# Patient Record
Sex: FEMALE | Race: WHITE | NOT HISPANIC OR LATINO
[De-identification: names, ages, dates, MRNs, and addresses within clinical notes are randomized per-mention and may not be internally consistent; named-entity substitution may affect disease eponyms.]

---

## 2018-02-09 ENCOUNTER — TRANSCRIPTION ENCOUNTER (OUTPATIENT)
Age: 78
End: 2018-02-09

## 2021-06-25 ENCOUNTER — APPOINTMENT (OUTPATIENT)
Dept: SURGERY | Facility: CLINIC | Age: 81
End: 2021-06-25

## 2021-07-02 ENCOUNTER — APPOINTMENT (OUTPATIENT)
Dept: SURGERY | Facility: CLINIC | Age: 81
End: 2021-07-02

## 2021-07-09 ENCOUNTER — APPOINTMENT (OUTPATIENT)
Dept: SURGERY | Facility: CLINIC | Age: 81
End: 2021-07-09

## 2021-07-16 ENCOUNTER — APPOINTMENT (OUTPATIENT)
Dept: SURGERY | Facility: CLINIC | Age: 81
End: 2021-07-16

## 2021-07-23 ENCOUNTER — APPOINTMENT (OUTPATIENT)
Dept: SURGERY | Facility: CLINIC | Age: 81
End: 2021-07-23

## 2021-07-26 PROBLEM — Z00.00 ENCOUNTER FOR PREVENTIVE HEALTH EXAMINATION: Status: ACTIVE | Noted: 2021-07-26

## 2021-09-13 DIAGNOSIS — Z86.61 PERSONAL HISTORY OF INFECTIONS OF THE CENTRAL NERVOUS SYSTEM: ICD-10-CM

## 2021-09-13 DIAGNOSIS — Z87.39 PERSONAL HISTORY OF OTHER DISEASES OF THE MUSCULOSKELETAL SYSTEM AND CONNECTIVE TISSUE: ICD-10-CM

## 2021-09-13 DIAGNOSIS — Z87.898 PERSONAL HISTORY OF OTHER SPECIFIED CONDITIONS: ICD-10-CM

## 2021-09-13 DIAGNOSIS — Z86.69 PERSONAL HISTORY OF OTHER DISEASES OF THE NERVOUS SYSTEM AND SENSE ORGANS: ICD-10-CM

## 2021-09-14 ENCOUNTER — NON-APPOINTMENT (OUTPATIENT)
Age: 81
End: 2021-09-14

## 2021-09-14 ENCOUNTER — APPOINTMENT (OUTPATIENT)
Dept: BREAST CENTER | Facility: CLINIC | Age: 81
End: 2021-09-14
Payer: MEDICARE

## 2021-09-14 VITALS — DIASTOLIC BLOOD PRESSURE: 81 MMHG | SYSTOLIC BLOOD PRESSURE: 177 MMHG | HEART RATE: 88 BPM | OXYGEN SATURATION: 96 %

## 2021-09-14 VITALS — WEIGHT: 121 LBS | HEIGHT: 58 IN | BODY MASS INDEX: 25.4 KG/M2

## 2021-09-14 DIAGNOSIS — R92.2 INCONCLUSIVE MAMMOGRAM: ICD-10-CM

## 2021-09-14 DIAGNOSIS — Z87.891 PERSONAL HISTORY OF NICOTINE DEPENDENCE: ICD-10-CM

## 2021-09-14 DIAGNOSIS — Z92.89 PERSONAL HISTORY OF OTHER MEDICAL TREATMENT: ICD-10-CM

## 2021-09-14 DIAGNOSIS — I87.1 COMPRESSION OF VEIN: ICD-10-CM

## 2021-09-14 DIAGNOSIS — N63.0 UNSPECIFIED LUMP IN UNSPECIFIED BREAST: ICD-10-CM

## 2021-09-14 PROCEDURE — 99204 OFFICE O/P NEW MOD 45 MIN: CPT

## 2021-09-14 RX ORDER — PANTOPRAZOLE 40 MG/1
40 TABLET, DELAYED RELEASE ORAL
Refills: 0 | Status: ACTIVE | COMMUNITY

## 2021-09-14 RX ORDER — ALENDRONATE SODIUM 70 MG/1
70 TABLET ORAL
Refills: 0 | Status: ACTIVE | COMMUNITY

## 2021-09-14 RX ORDER — PREDNISONE 10 MG
10 TABLET ORAL
Refills: 0 | Status: ACTIVE | COMMUNITY

## 2021-09-14 RX ORDER — UMECLIDINIUM BROMIDE AND VILANTEROL TRIFENATATE 62.5; 25 UG/1; UG/1
POWDER RESPIRATORY (INHALATION)
Refills: 0 | Status: ACTIVE | COMMUNITY

## 2021-09-14 RX ORDER — ATORVASTATIN CALCIUM 40 MG/1
40 TABLET, FILM COATED ORAL
Refills: 0 | Status: ACTIVE | COMMUNITY

## 2021-09-14 RX ORDER — CITALOPRAM HYDROBROMIDE 10 MG/1
TABLET, FILM COATED ORAL
Refills: 0 | Status: ACTIVE | COMMUNITY

## 2021-09-14 RX ORDER — OXYCODONE 10 MG/1
10 TABLET ORAL
Refills: 0 | Status: ACTIVE | COMMUNITY

## 2021-09-14 RX ORDER — FUROSEMIDE 20 MG/1
20 TABLET ORAL
Refills: 0 | Status: ACTIVE | COMMUNITY

## 2021-09-14 RX ORDER — PREGABALIN 75 MG/1
75 CAPSULE ORAL
Refills: 0 | Status: ACTIVE | COMMUNITY

## 2021-09-14 RX ORDER — RIFAMPIN 300 MG/1
300 CAPSULE ORAL
Refills: 0 | Status: ACTIVE | COMMUNITY

## 2021-09-14 RX ORDER — FENTANYL 25 UG/H
25 PATCH, EXTENDED RELEASE TRANSDERMAL
Refills: 0 | Status: ACTIVE | COMMUNITY

## 2021-09-14 RX ORDER — OXYCODONE HYDROCHLORIDE AND ACETAMINOPHEN 5; 325 MG/1; MG/1
5-325 TABLET ORAL
Refills: 0 | Status: ACTIVE | COMMUNITY

## 2021-09-14 NOTE — HISTORY OF PRESENT ILLNESS
[FreeTextEntry1] : The patient is an 81-year-old white female with a long smoking history of 1-1/2 packs a day for many years.  She currently has COPD and has been on oxygen for the last year.  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  She developed some facial and left breast swelling and had shortness of breath and was admitted to [facility].  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  She was seen by the breast fellow at that time and felt to have lymphedema and is now sent in for post admission follow-up.

## 2021-09-14 NOTE — REASON FOR VISIT
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  She was here [facility] in August 2021 and was noticed had some increased size in the left breast consistent with lymphedema of the left breast and was sent for mammography and ultrasound on August 6, 2021 showing some edema in the left breast but with improvement but no suspicious findings.  She comes in now for a breast surgical evaluation.

## 2021-09-14 NOTE — PHYSICAL EXAM
[Normocephalic] : normocephalic [Atraumatic] : atraumatic [EOMI] : extra ocular movement intact [Supple] : supple [No Supraclavicular Adenopathy] : no supraclavicular adenopathy [No Cervical Adenopathy] : no cervical adenopathy [Examined in the supine and seated position] : examined in the supine and seated position [No dominant masses] : no dominant masses in right breast  [No dominant masses] : no dominant masses left breast [No Nipple Retraction] : no left nipple retraction [No Nipple Discharge] : no left nipple discharge [Breast Mass Right Breast ___cm] : no masses [Breast Mass Left Breast ___cm] : no masses [Breast Nipple Inversion] : nipples not inverted [Breast Nipple Retraction] : nipples not retracted [Breast Nipple Flattening] : nipples not flattened [Breast Nipple Fissures] : nipples not fissured [Breast Abnormal Lactation (Galactorrhea)] : no galactorrhea [Breast Abnormal Secretion Bloody Fluid] : no bloody discharge [Breast Abnormal Secretion Serous Fluid] : no serous discharge [Breast Abnormal Secretion Opalescent Fluid] : no milky discharge [No Axillary Lymphadenopathy] : no left axillary lymphadenopathy [No Edema] : no edema [No Rashes] : no rashes [No Ulceration] : no ulceration [de-identified] : On exam, the patient has ptotic B-cup breasts with the left breast larger than the right.  On palpation I cannot feel any suspicious densities in either breast.  She does have some significant swelling of the left breast but no palpable masses and this is all consistent with lymphedema of the breast and she does have pitting.  She has no axillary, supraclavicular, or cervical adenopathy.  There are some soft benign palpable lymph nodes felt in the left axilla. [de-identified] : Left breast swelling with some pitting edema consistent with lymphedema of the breast.

## 2021-09-14 NOTE — ASSESSMENT
[FreeTextEntry1] : The patient is an 81-year-old white female with a long smoking history of 1-1/2 packs a day for many years.  She currently has COPD and has been on oxygen for the last year.  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  She developed some facial and left breast swelling and had shortness of breath and was admitted to [facility].  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  She was seen by the breast fellow at that time and felt to have lymphedema.  She did undergo a CT angiogram while she was in the hospital which did show some upper extremity venous hypertension prior to removal of the Port-A-Cath.  She underwent a bilateral mammography and ultrasound on August 6, 2021 which actually showed some interval resolution of some of her right breast edema and skin thickening and some mild improvement of some of her left breast edema and skin thickening.  On exam, she has no suspicious masses in either breast but has an obvious larger left breast consistent with lymphedema since there is pitting.  She has no signs of any arm lymphedema however.  I do not believe this is a breast malignancy causing lymphedema.  I think this is related to likely some venous obstruction issues.  These may have improved somewhat with removal of the Port-A-Cath though the patient does not notice an improvement.  In either case unless this is her breast cancer, there is nothing that I can do to help with this lymphedema.  She was advised on symptomatic relief with a good compressive bra which will likely help some of the swelling.  She does have a PET scan scheduled so her lymph nodes can be evaluated to determine if this is a malignant etiology.  We will send me a copy the report.  Otherwise I would continue symptomatic relief and continue her routine follow-up with her oncologist, Dr. Garrett Madrigal, at [facility].  She can follow-up with me on a as needed basis.

## 2021-09-14 NOTE — REVIEW OF SYSTEMS
[Shortness Of Breath] : shortness of breath [Heartburn] : heartburn [Incontinence] : incontinence [Joint Stiffness] : joint stiffness [Anxiety] : anxiety [Depression] : depression [Easy Bruising] : a tendency for easy bruising [Negative] : Endocrine

## 2021-11-18 ENCOUNTER — APPOINTMENT (OUTPATIENT)
Dept: PULMONOLOGY | Facility: CLINIC | Age: 81
End: 2021-11-18
Payer: MEDICARE

## 2021-11-18 VITALS
DIASTOLIC BLOOD PRESSURE: 74 MMHG | WEIGHT: 127 LBS | HEIGHT: 58 IN | SYSTOLIC BLOOD PRESSURE: 172 MMHG | HEART RATE: 74 BPM | BODY MASS INDEX: 26.66 KG/M2 | TEMPERATURE: 97.2 F | OXYGEN SATURATION: 96 %

## 2021-11-18 DIAGNOSIS — R06.02 SHORTNESS OF BREATH: ICD-10-CM

## 2021-11-18 DIAGNOSIS — J44.1 CHRONIC OBSTRUCTIVE PULMONARY DISEASE WITH (ACUTE) EXACERBATION: ICD-10-CM

## 2021-11-18 PROCEDURE — 99204 OFFICE O/P NEW MOD 45 MIN: CPT

## 2021-11-18 RX ORDER — TIOTROPIUM BROMIDE AND OLODATEROL 3.124; 2.736 UG/1; UG/1
2.5-2.5 SPRAY, METERED RESPIRATORY (INHALATION)
Qty: 1 | Refills: 5 | Status: ACTIVE | COMMUNITY
Start: 2021-11-18 | End: 1900-01-01

## 2021-11-18 RX ORDER — ALBUTEROL SULFATE 2.5 MG/3ML
(2.5 MG/3ML) SOLUTION RESPIRATORY (INHALATION)
Qty: 1 | Refills: 5 | Status: ACTIVE | COMMUNITY
Start: 2021-11-18 | End: 1900-01-01

## 2021-11-18 NOTE — REASON FOR VISIT
[Initial] : an initial visit [COPD] : COPD [Emphysema] : emphysema [Formal Caregiver] : formal caregiver [TextBox_13] : Dr. Shore

## 2021-11-18 NOTE — HISTORY OF PRESENT ILLNESS
[>= 30 pack years] : >= 30 pack years [Former] : former [Never] : never [Lung Cancer Screening] : Patient underwent lung cancer screening [0] : 0 [TextBox_4] : 81F with COPD c/b chronic hypoxemic respiratory failure on home O2 here to establish care.\par \par Previously seen by Dr. 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 These symptoms have progressed over the past few years. She was previously followed by Dr. Astudillo and started on Anoro a few years ago. Also started on prednisone 10mg daily for past few years. Reports her symptoms have not improved with Anoro or prednisone. She has had 2 ED visits for past 2-3 months for worsening dyspnea and noted to be anemic with Hgb <7 on each admission. She has required repeated blood transfusions with subsequent improvement in breathing. She is waiting to see Hematology for further work-up. She has since had EGD/colonoscopy by her GI physician at [facility] and was told everything was fine and no active bleeding was seen.\par \par Pt is accompanied by her aide today and they both endorse that despite blood transfusions, her dyspnea on exertion is getting worse. She has had to increase her supplemental O2 to 4L NC to support her breathing. Has dry throat causing cough but otherwise no chronic phlegm, hemoptysis, chest pain, syncope. Does have occasional foot swelling which goes away when she elevates her feet. No PND, orthopnea.\par \par  [TextBox_11] : 1-2 [TextBox_13] : 51 [YearQuit] : 2014 [TextBox_12] : 07/2021

## 2021-11-18 NOTE — PHYSICAL EXAM
[No Acute Distress] : no acute distress [Normal Oropharynx] : normal oropharynx [Normal Appearance] : normal appearance [No Neck Mass] : no neck mass [Normal Rate/Rhythm] : normal rate/rhythm [Normal S1, S2] : normal s1, s2 [No Murmurs] : no murmurs [No Resp Distress] : no resp distress [No Acc Muscle Use] : no acc muscle use [Clear to Auscultation Bilaterally] : clear to auscultation bilaterally [No Abnormalities] : no abnormalities [Benign] : benign [Not Tender] : not tender [Normal Gait] : normal gait [Normal Color/ Pigmentation] : normal color/ pigmentation [No Rash] : no rash [No Focal Deficits] : no focal deficits [No Motor Deficits] : no motor deficits [Oriented x3] : oriented x3 [Normal Mood] : normal mood [Normal Affect] : normal affect [TextBox_68] : No wheezes

## 2021-11-18 NOTE — REVIEW OF SYSTEMS
[Cough] : cough [Dyspnea] : dyspnea [SOB on Exertion] : sob on exertion [Anemia] : anemia [Blood Transfusion] : blood transfusion [History of Iron Deficiency] : history of iron deficiency [Fever] : no fever [Fatigue] : no fatigue [Chills] : no chills [Poor Appetite] : no poor appetite [Ear Disturbance] : no ear disturbance [Nasal Congestion] : no nasal congestion [Sinus Problems] : no sinus problems [Hemoptysis] : no hemoptysis [Sputum] : no sputum [Pleuritic Pain] : no pleuritic pain [Chest Discomfort] : no chest discomfort [Orthopnea] : no orthopnea [Palpitations] : no palpitations [Syncope] : no syncope [Watery Eyes] : no watery eyes [Nasal Discharge] : no nasal discharge [GERD] : no gerd [Diarrhea] : no diarrhea [Food Intolerance] : no food intolerance [Nocturia] : no nocturia [Dysuria] : no dysuria [Arthralgias] : no arthralgias [Trauma/ Injury] : no trauma/ injury [Rash] : no rash [Ulcerations] : no ulcerations [Headache] : no headache [Dizziness] : no dizziness [Numbness] : no numbness [Depression] : no depression [Anxiety] : no anxiety [Diabetes] : no diabetes [Thyroid Problem] : no thyroid problem

## 2021-11-18 NOTE — DISCUSSION/SUMMARY
[FreeTextEntry1] : 81F former smoker with COPD c/b chronic hypoxemic respiratory failure on home O2 here for evaluation of worsening dyspnea on exertion\par \par #COPD/emphysema with chronic hypoxemia\par - LIkely multifactorial. 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 I discussed her overall disease trajectory with Pt at length\par - At this time, will optimize inhaler regimen. She is taking Anoro, which is a PDI and per Pt's aide, she cannot generate a large/deep enough breath to get the benefit from the medications\par - Will send Rx for Stiolto respimate. Also add albuterol nebs to regimen. Can also consider adding ICS to regimen for maximal triple therapy\par - If Pt with frequent exacerbations, she would be candidate for roflumialst vs TIW azithromycin\par - If Pt stabilizes on optimized regimen, then will consider tapering down prednisone

## 2021-11-23 ENCOUNTER — APPOINTMENT (OUTPATIENT)
Dept: HEMATOLOGY ONCOLOGY | Facility: CLINIC | Age: 81
End: 2021-11-23
Payer: MEDICARE

## 2021-11-23 VITALS
TEMPERATURE: 98.2 F | WEIGHT: 125 LBS | BODY MASS INDEX: 26.24 KG/M2 | OXYGEN SATURATION: 95 % | RESPIRATION RATE: 18 BRPM | DIASTOLIC BLOOD PRESSURE: 88 MMHG | HEIGHT: 58 IN | SYSTOLIC BLOOD PRESSURE: 167 MMHG | HEART RATE: 77 BPM

## 2021-11-23 PROCEDURE — 99205 OFFICE O/P NEW HI 60 MIN: CPT

## 2021-11-24 NOTE — REASON FOR VISIT
[Initial Consultation] : an initial consultation for [FreeTextEntry2] : Severe iron deficiency anemia having require transfusional support.

## 2021-11-24 NOTE — REVIEW OF SYSTEMS
[Patient Intake Form Reviewed] : Patient intake form was reviewed [Lower Ext Edema] : lower extremity edema [Shortness Of Breath] : shortness of breath [Cough] : cough [SOB on Exertion] : shortness of breath during exertion [Joint Pain] : joint pain [Difficulty Walking] : difficulty walking [Easy Bruising] : a tendency for easy bruising [Fatigue] : fatigue [Negative] : Allergic/Immunologic [FreeTextEntry6] : dry cough [FreeTextEntry7] : reflux [FreeTextEntry9] : back pain [de-identified] : uses walker [FreeTextEntry1] : Penicillins and latex gloves

## 2021-11-24 NOTE — HISTORY OF PRESENT ILLNESS
[0 - No Distress] : Distress Level: 0 [de-identified] : Is a very pleasant 81-year-old woman with the below past medical history who has been referred for further evaluation of anemia.  She reports that an anemia is a relatively new diagnosis to her that she first heard about 1 to 2 years ago.  She reports that she has been requiring transfusional support every 2 to 3 months for the better part of the last 1 year.  Her most recent hospitalization was in the beginning of November where she presented with worsening shortness of breath and a hemoglobin of 6.3 g/dL.  She received transfusional support with improvement in symptoms and discharge.  She reports a recent bidirectional endoscopy approximately 3 weeks ago without any significant reported pathological findings.  She denies any signs of blood loss including melena.

## 2021-11-24 NOTE — ASSESSMENT
[FreeTextEntry1] : This appears to be a relatively chronic and progressive finding.\par I suspect that this is likely a multifactorial process including iron deficiency, history of chemotherapy and radiation exposure and at this time I am also unable to rule out a hypoproliferative bone marrow state.\par She reports that she has required transfusion support every couple of months for the better part of the last year.\par I will obtain a CBC with differential and a peripheral blood smear review.\par I will obtain a complete hematological workup to include a CMP, LDH, haptoglobin, reticulocyte count, B12/MMA/folic acid levels, TSH, SPEP, SIEP, IgQ,serum free light chains, PARIS,  RF, panel, ferritin level, and a Sierra' test.\par If she continues to remain iron deficient then intravenous iron will be considered for more rapid replacement to better assess the overall marrow function.  I have also encouraged her to follow-up with her gastroenterologist to consider having a capsule endoscopy performed given the reported negative bidirectional endoscopy.  I will try to obtain these records from her gastroenterologist.\par If the above work-up is unremarkable she may benefit from a bone marrow biopsy examination.\par The patient will return in 2-3 weeks for followup, review of the above workup, and further recommendations.\par \par Thank you very much for allowing me to participate in the care of this patient. Should you have any questions or concerns please do not hesitate to call me directly.

## 2021-11-24 NOTE — PHYSICAL EXAM
[Ambulatory and capable of all self care but unable to carry out any work activities] : Status 2- Ambulatory and capable of all self care but unable to carry out any work activities. Up and about more than 50% of waking hours [Normal] : affect appropriate [de-identified] : Appears chronically ill receiving supplemental oxygen via NC [de-identified] : Decreased breath sounds

## 2021-12-08 ENCOUNTER — APPOINTMENT (OUTPATIENT)
Dept: HEMATOLOGY ONCOLOGY | Facility: CLINIC | Age: 81
End: 2021-12-08
Payer: MEDICARE

## 2021-12-08 VITALS
WEIGHT: 124 LBS | HEIGHT: 58 IN | BODY MASS INDEX: 26.03 KG/M2 | DIASTOLIC BLOOD PRESSURE: 80 MMHG | RESPIRATION RATE: 18 BRPM | HEART RATE: 50 BPM | TEMPERATURE: 98 F | SYSTOLIC BLOOD PRESSURE: 182 MMHG | OXYGEN SATURATION: 95 %

## 2021-12-08 DIAGNOSIS — J44.9 CHRONIC OBSTRUCTIVE PULMONARY DISEASE, UNSPECIFIED: ICD-10-CM

## 2021-12-08 LAB
25(OH)D3 SERPL-MCNC: 31.8 NG/ML
ALBUMIN MFR SERPL ELPH: 54.3 %
ALBUMIN SERPL-MCNC: 3.8 G/DL
ALBUMIN/GLOB SERPL: 1.2 RATIO
ALPHA1 GLOB MFR SERPL ELPH: 5.9 %
ALPHA1 GLOB SERPL ELPH-MCNC: 0.4 G/DL
ALPHA2 GLOB MFR SERPL ELPH: 10.2 %
ALPHA2 GLOB SERPL ELPH-MCNC: 0.7 G/DL
ANA PAT FLD IF-IMP: ABNORMAL
ANA SER IF-ACNC: ABNORMAL
B-GLOBULIN MFR SERPL ELPH: 15.9 %
B-GLOBULIN SERPL ELPH-MCNC: 1.1 G/DL
CEA SERPL-MCNC: 1.8 NG/ML
DEPRECATED KAPPA LC FREE/LAMBDA SER: 5.93 RATIO
DEPRECATED KAPPA LC FREE/LAMBDA SER: 5.93 RATIO
FOLATE SERPL-MCNC: 14.9 NG/ML
GAMMA GLOB FLD ELPH-MCNC: 1 G/DL
GAMMA GLOB MFR SERPL ELPH: 13.7 %
IGA SER QL IEP: 206 MG/DL
IGG SER QL IEP: 1113 MG/DL
IGM SER QL IEP: 346 MG/DL
INTERPRETATION SERPL IEP-IMP: NORMAL
KAPPA LC CSF-MCNC: 2.03 MG/DL
KAPPA LC CSF-MCNC: 2.03 MG/DL
KAPPA LC SERPL-MCNC: 12.04 MG/DL
KAPPA LC SERPL-MCNC: 12.04 MG/DL
M PROTEIN MFR SERPL ELPH: 5.6 %
M PROTEIN SPEC IFE-MCNC: NORMAL
METHYLMALONATE SERPL-SCNC: 411 NMOL/L
MONOCLON BAND OBS SERPL: 0.4 G/DL
PROT SERPL-MCNC: 7 G/DL
PROT SERPL-MCNC: 7 G/DL
VIT B12 SERPL-MCNC: 708 PG/ML

## 2021-12-08 PROCEDURE — 99215 OFFICE O/P EST HI 40 MIN: CPT

## 2021-12-10 PROBLEM — J44.9 COPD WITH CHRONIC BRONCHITIS AND EMPHYSEMA: Status: ACTIVE | Noted: 2021-09-13

## 2021-12-10 NOTE — REVIEW OF SYSTEMS
[Patient Intake Form Reviewed] : Patient intake form was reviewed [Fatigue] : fatigue [Lower Ext Edema] : lower extremity edema [Shortness Of Breath] : shortness of breath [Cough] : cough [SOB on Exertion] : shortness of breath during exertion [Joint Pain] : joint pain [Difficulty Walking] : difficulty walking [Easy Bruising] : a tendency for easy bruising [Negative] : Allergic/Immunologic [FreeTextEntry6] : dry cough [FreeTextEntry7] : reflux [FreeTextEntry9] : back pain [de-identified] : uses walker [FreeTextEntry1] : Penicillins and latex gloves

## 2021-12-10 NOTE — ASSESSMENT
[FreeTextEntry1] : This appears to be a relatively chronic and progressive finding.\par I suspect that this is likely a multifactorial process including iron deficiency, history of chemotherapy and radiation exposure and at this time I am also unable to rule out a hypoproliferative bone marrow state.\par She reports that she has required transfusion support every couple of months for the better part of the last year.\par I obtained a CBC with differential and a peripheral blood smear review.\par I obtained a complete hematological workup to include a CMP, LDH, haptoglobin, reticulocyte count, B12/MMA/folic acid levels, TSH, SPEP, SIEP, IgQ,serum free light chains, PARIS,  RF, panel, ferritin level, and a Sierra' test.\par Her repeat hemoglobin today is over 10 g/dL. Her iron studies show normalization of her indices. Additionally on this work-up she was found to have an elevated MMA level and a very small (0.1 g/dL) IgM kappa monoclonal protein. I will initiate her on parenteral B12 given the elevated MMA.  I have also encouraged her to follow-up with her gastroenterologist to consider having a capsule endoscopy performed given the reported negative bidirectional endoscopy.\par I am unable to rule out a hypoproliferative bone marrow disorder at this time, however, in the face of a B12 deficiency a bone marrow examination may look identical to a myelodysplastic bone marrow. Therefore, I would like to correct her B12 deficiency first and if there is not an improvement in her counts then a bone marrow biopsy may be warranted.\par She will return for follow-up in 1 month at which time I will reassess her hematological status and further recommendations will be made then.

## 2021-12-10 NOTE — PHYSICAL EXAM
[Ambulatory and capable of all self care but unable to carry out any work activities] : Status 2- Ambulatory and capable of all self care but unable to carry out any work activities. Up and about more than 50% of waking hours [Normal] : affect appropriate [de-identified] : Appears chronically ill receiving supplemental oxygen via NC [de-identified] : Decreased breath sounds

## 2021-12-20 LAB
ANA SER IF-ACNC: NEGATIVE
FERRITIN SERPL-MCNC: 48 NG/ML
IRON SATN MFR SERPL: 46 %
IRON SERPL-MCNC: 147 UG/DL
TIBC SERPL-MCNC: 317 UG/DL
UIBC SERPL-MCNC: 170 UG/DL

## 2021-12-22 ENCOUNTER — APPOINTMENT (OUTPATIENT)
Dept: HEMATOLOGY ONCOLOGY | Facility: CLINIC | Age: 81
End: 2021-12-22
Payer: MEDICARE

## 2021-12-22 VITALS
DIASTOLIC BLOOD PRESSURE: 65 MMHG | WEIGHT: 126 LBS | TEMPERATURE: 97.5 F | RESPIRATION RATE: 18 BRPM | OXYGEN SATURATION: 94 % | BODY MASS INDEX: 26.45 KG/M2 | HEART RATE: 56 BPM | SYSTOLIC BLOOD PRESSURE: 167 MMHG | HEIGHT: 58 IN

## 2021-12-22 PROCEDURE — 99214 OFFICE O/P EST MOD 30 MIN: CPT

## 2021-12-22 NOTE — REASON FOR VISIT
[Follow-Up Visit] : a follow-up visit for [FreeTextEntry2] : Severe iron deficiency anemia having require transfusional support.

## 2021-12-22 NOTE — HISTORY OF PRESENT ILLNESS
[0 - No Distress] : Distress Level: 0 [de-identified] : Is a very pleasant 81-year-old woman with the below past medical history who has been referred for further evaluation of anemia.  She reports that an anemia is a relatively new diagnosis to her that she first heard about 1 to 2 years ago.  She reports that she has been requiring transfusional support every 2 to 3 months for the better part of the last 1 year.  Her most recent hospitalization was in the beginning of November where she presented with worsening shortness of breath and a hemoglobin of 6.3 g/dL.  She received transfusional support with improvement in symptoms and discharge.  She reports a recent bidirectional endoscopy approximately 3 weeks ago without any significant reported pathological findings.  She denies any signs of blood loss including melena. [de-identified] : She reports an increasing degree of shortness of breath over the last several days.

## 2021-12-22 NOTE — PHYSICAL EXAM
[Ambulatory and capable of all self care but unable to carry out any work activities] : Status 2- Ambulatory and capable of all self care but unable to carry out any work activities. Up and about more than 50% of waking hours [Normal] : affect appropriate [de-identified] : Appears chronically ill receiving supplemental oxygen via NC [de-identified] : Decreased breath sounds

## 2021-12-22 NOTE — REVIEW OF SYSTEMS
[Patient Intake Form Reviewed] : Patient intake form was reviewed [Fatigue] : fatigue [Lower Ext Edema] : lower extremity edema [Shortness Of Breath] : shortness of breath [Cough] : cough [SOB on Exertion] : shortness of breath during exertion [Joint Pain] : joint pain [Difficulty Walking] : difficulty walking [Easy Bruising] : a tendency for easy bruising [Negative] : Allergic/Immunologic [FreeTextEntry6] : dry cough.  Increased shortness of breath over the last 2 to 3 days. [FreeTextEntry7] : reflux [FreeTextEntry9] : back pain [de-identified] : uses walker [FreeTextEntry1] : Penicillins and latex gloves

## 2021-12-22 NOTE — ASSESSMENT
[FreeTextEntry1] : This appears to be a relatively chronic and progressive finding.\par I suspect that this is likely a multifactorial process including iron deficiency, history of chemotherapy and radiation exposure and at this time I am also unable to rule out a hypoproliferative bone marrow state.\par She reports that she has required transfusion support every couple of months for the better part of the last year.\par I obtained a CBC with differential and a peripheral blood smear review.\par I obtained a complete hematological workup to include a CMP, LDH, haptoglobin, reticulocyte count, B12/MMA/folic acid levels, TSH, SPEP, SIEP, IgQ,serum free light chains, PARIS,  RF, panel, ferritin level, and a Sierra' test.\par Her repeat hemoglobin today is over 10 g/dL. Her iron studies show normalization of her indices. Additionally on this work-up she was found to have an elevated MMA level and a very small (0.1 g/dL) IgM kappa monoclonal protein. I will initiate her on parenteral B12 given the elevated MMA.  She follow-up with her gastroenterologist and a pill endoscopy is being planned for next Monday.\par I am unable to rule out a hypoproliferative bone marrow disorder at this time, however, in the face of a B12 deficiency a bone marrow examination may look identical to a myelodysplastic bone marrow. Therefore, I would like to correct her B12 deficiency first and if there is not an improvement in her counts then a bone marrow biopsy may be warranted.  Her hemoglobin continues to improve and is at 10.6 g/dL today.  Given the improving and adequate hemoglobin I suspect that her increasing shortness of breath is related to her pulmonary disease.  She  is advised to follow-up with her pulmonologist Dr. Cevallos, which she agrees to do today.\par She will return for follow-up in 2 to 3 weeks at which time I will reassess her hematological status and further recommendations will be made then.

## 2022-01-10 ENCOUNTER — APPOINTMENT (OUTPATIENT)
Dept: HEMATOLOGY ONCOLOGY | Facility: CLINIC | Age: 82
End: 2022-01-10
Payer: MEDICARE

## 2022-01-10 VITALS
SYSTOLIC BLOOD PRESSURE: 185 MMHG | BODY MASS INDEX: 26.66 KG/M2 | RESPIRATION RATE: 18 BRPM | TEMPERATURE: 99.6 F | HEART RATE: 88 BPM | HEIGHT: 58 IN | DIASTOLIC BLOOD PRESSURE: 77 MMHG | WEIGHT: 127 LBS | OXYGEN SATURATION: 91 %

## 2022-01-10 DIAGNOSIS — E61.1 IRON DEFICIENCY: ICD-10-CM

## 2022-01-10 DIAGNOSIS — E53.8 DEFICIENCY OF OTHER SPECIFIED B GROUP VITAMINS: ICD-10-CM

## 2022-01-10 DIAGNOSIS — Z99.81 CHRONIC RESPIRATORY FAILURE WITH HYPOXIA: ICD-10-CM

## 2022-01-10 DIAGNOSIS — D47.2 MONOCLONAL GAMMOPATHY: ICD-10-CM

## 2022-01-10 DIAGNOSIS — D64.9 ANEMIA, UNSPECIFIED: ICD-10-CM

## 2022-01-10 DIAGNOSIS — Z85.118 PERSONAL HISTORY OF OTHER MALIGNANT NEOPLASM OF BRONCHUS AND LUNG: ICD-10-CM

## 2022-01-10 DIAGNOSIS — Z85.01 PERSONAL HISTORY OF MALIGNANT NEOPLASM OF ESOPHAGUS: ICD-10-CM

## 2022-01-10 DIAGNOSIS — J96.11 CHRONIC RESPIRATORY FAILURE WITH HYPOXIA: ICD-10-CM

## 2022-01-10 PROCEDURE — 99214 OFFICE O/P EST MOD 30 MIN: CPT

## 2022-01-10 NOTE — PHYSICAL EXAM
[Ambulatory and capable of all self care but unable to carry out any work activities] : Status 2- Ambulatory and capable of all self care but unable to carry out any work activities. Up and about more than 50% of waking hours [Normal] : affect appropriate [de-identified] : Appears chronically ill receiving supplemental oxygen via NC [de-identified] : Decreased breath sounds

## 2022-01-10 NOTE — REVIEW OF SYSTEMS
[Patient Intake Form Reviewed] : Patient intake form was reviewed [Fatigue] : fatigue [Lower Ext Edema] : lower extremity edema [Shortness Of Breath] : shortness of breath [Cough] : cough [SOB on Exertion] : shortness of breath during exertion [Joint Pain] : joint pain [Difficulty Walking] : difficulty walking [Easy Bruising] : a tendency for easy bruising [Negative] : Allergic/Immunologic [FreeTextEntry6] : dry cough.  Shortness of breath is stable. [FreeTextEntry7] : reflux [FreeTextEntry9] : back pain [de-identified] : uses walker [FreeTextEntry1] : Penicillins and latex gloves

## 2022-01-10 NOTE — ASSESSMENT
[FreeTextEntry1] : This appears to be a relatively chronic and progressive finding.\par I suspect that this is likely a multifactorial process including iron deficiency, history of chemotherapy and radiation exposure and at this time I am also unable to rule out a hypoproliferative bone marrow state.\par She reports that she has required transfusion support every couple of months for the better part of the last year.\par I obtained a CBC with differential and a peripheral blood smear review.\par I obtained a complete hematological workup to include a CMP, LDH, haptoglobin, reticulocyte count, B12/MMA/folic acid levels, TSH, SPEP, SIEP, IgQ,serum free light chains, PARIS,  RF, panel, ferritin level, and a Sierra' test.\par Her repeat hemoglobin today is over 10 g/dL. Her iron studies show normalization of her indices. Additionally on this work-up she was found to have an elevated MMA level and a very small (0.1 g/dL) IgM kappa monoclonal protein. I will initiate her on parenteral B12 given the elevated MMA.  She follow-up with her gastroenterologist and a pill endoscopy is being planned for next Monday.\par I am unable to rule out a hypoproliferative bone marrow disorder at this time, however, in the face of a B12 deficiency a bone marrow examination may look identical to a myelodysplastic bone marrow. Therefore, I would like to correct her B12 deficiency first and if there is not an improvement in her counts then a bone marrow biopsy may be warranted.  Her hemoglobin has improved and is at 10.4 g/dL today.  Given the improving and adequate hemoglobin I suspect that her shortness of breath is related to her pulmonary disease.  She  is advised to follow-up with her pulmonologist Dr. Cevallos, whom she reports she will be seeing later this week.\par She will return for follow-up in 2 to 3 weeks at which time I will reassess her hematological status and further recommendations will be made then.

## 2022-01-10 NOTE — HISTORY OF PRESENT ILLNESS
[0 - No Distress] : Distress Level: 0 [de-identified] : This is a very pleasant 81-year-old woman with the below past medical history who has been referred for further evaluation of anemia.  She reports that an anemia is a relatively new diagnosis to her that she first heard about 1 to 2 years ago.  She reports that she has been requiring transfusional support every 2 to 3 months for the better part of the last 1 year.  Her most recent hospitalization was in the beginning of November where she presented with worsening shortness of breath and a hemoglobin of 6.3 g/dL.  She received transfusional support with improvement in symptoms and discharge.  She reports a recent bidirectional endoscopy approximately 3 weeks ago without any significant reported pathological findings.  She denies any signs of blood loss including melena. [de-identified] : She reports that her shortness of breath has been stable since the last visit.

## 2022-01-13 ENCOUNTER — APPOINTMENT (OUTPATIENT)
Dept: PULMONOLOGY | Facility: CLINIC | Age: 82
End: 2022-01-13
Payer: MEDICARE

## 2022-01-13 VITALS
WEIGHT: 127 LBS | SYSTOLIC BLOOD PRESSURE: 128 MMHG | DIASTOLIC BLOOD PRESSURE: 76 MMHG | HEIGHT: 58 IN | BODY MASS INDEX: 26.66 KG/M2

## 2022-01-13 DIAGNOSIS — J44.9 CHRONIC OBSTRUCTIVE PULMONARY DISEASE, UNSPECIFIED: ICD-10-CM

## 2022-01-13 PROCEDURE — 99213 OFFICE O/P EST LOW 20 MIN: CPT

## 2022-01-13 RX ORDER — PREDNISONE 5 MG/1
5 TABLET ORAL
Qty: 30 | Refills: 0 | Status: ACTIVE | COMMUNITY
Start: 2022-01-13 | End: 1900-01-01

## 2022-01-13 RX ORDER — FLUTICASONE PROPIONATE 220 UG/1
220 AEROSOL, METERED RESPIRATORY (INHALATION) TWICE DAILY
Qty: 1 | Refills: 5 | Status: ACTIVE | COMMUNITY
Start: 2022-01-13 | End: 1900-01-01

## 2022-01-13 NOTE — PHYSICAL EXAM
[No Acute Distress] : no acute distress [Normal Appearance] : normal appearance [No Neck Mass] : no neck mass [No JVD] : no jvd [Normal S1, S2] : normal s1, s2 [No Murmurs] : no murmurs [No Resp Distress] : no resp distress [Clear to Auscultation Bilaterally] : clear to auscultation bilaterally [Kyphosis] : kyphosis [Benign] : benign [No Clubbing] : no clubbing [Normal Color/ Pigmentation] : normal color/ pigmentation [Oriented x3] : oriented x3 [Normal Affect] : normal affect [TextBox_54] : tachycardic [TextBox_68] : No wheezes or crackles bilatearlly [TextBox_105] : trace pitting edema in bilateral LE's to level of mid-shin

## 2022-01-13 NOTE — HISTORY OF PRESENT ILLNESS
[>= 30 pack years] : >= 30 pack years [Former] : former [Never] : never [Lung Cancer Screening] : Patient underwent lung cancer screening [0] : 0 [TextBox_4] : 81F with COPD c/b chronic hypoxemic respiratory failure on home O2 here to establish care.\par \par Previously seen by Dr. 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 These symptoms have progressed over the past few years. She was previously followed by Dr. Astudillo and started on Anoro a few years ago. Also started on prednisone 10mg daily for past few years. Reports her symptoms have not improved with Anoro or prednisone. She has had 2 ED visits for past 2-3 months for worsening dyspnea and noted to be anemic with Hgb <7 on each admission. She has required repeated blood transfusions with subsequent improvement in breathing. She is waiting to see Hematology for further work-up. She has since had EGD/colonoscopy by her GI physician at [facility] and was told everything was fine and no active bleeding was seen.\par \par Pt is accompanied by her aide today and they both endorse that despite blood transfusions, her dyspnea on exertion is getting worse. She has had to increase her supplemental O2 to 4L NC to support her breathing. Has dry throat causing cough but otherwise no chronic phlegm, hemoptysis, chest pain, syncope. Does have occasional foot swelling which goes away when she elevates her feet. No PND, orthopnea.\par \par 01/13/22\par \par Returns today for follow-up symptoms after starting Stiolto. Pt reports she has good days & bad days; today is a bad day. She felt worsening shortness of breath when putting on clothes getting ready to leave her home today. She also endorses increased ankle swelling bilaterally. No recent fever, chills, hemoptysis, sputum production, ED visits, sick contacts. Since last visit she also saw Dr. Domingo Mendoza ([facility]) for work-up of her anemia. She is receiving Vit B injections with good response and underwent recent capsule endoscopy and planned for possible bone marrow biopsy. She has been compliant with taking Stiolto which she reports has improved her breathing. She is also using albuterol nebs as needed. She is eager to be weaned off prednisone. She is taking 10mg PO daily. [TextBox_11] : 1-2 [TextBox_13] : 50 [YearQuit] : 2014 [TextBox_12] : 07/2021

## 2022-01-13 NOTE — REVIEW OF SYSTEMS
[Fever] : no fever [Fatigue] : no fatigue [Chills] : no chills [Poor Appetite] : poor appetite [Dry Eyes] : no dry eyes [Nasal Congestion] : nasal congestion [Sinus Problems] : sinus problems [Cough] : cough [Hemoptysis] : no hemoptysis [Sputum] : no sputum [A.M. Dry Mouth] : a.m. dry mouth [SOB on Exertion] : sob on exertion [Hay Fever] : no hay fever [Watery Eyes] : no watery eyes [Nasal Discharge] : no nasal discharge [GERD] : no gerd [Diarrhea] : no diarrhea [Nocturia] : no nocturia [Arthralgias] : no arthralgias [Trauma/ Injury] : no trauma/ injury

## 2022-01-13 NOTE — DISCUSSION/SUMMARY
[FreeTextEntry1] : 81F former smoker with COPD c/b chronic hypoxemic respiratory failure on home O2 here for evaluation of worsening dyspnea on exertion\par \par #COPD/emphysema with chronic hypoxemia\par - LIkely multifactorial. Reviewed prior records for PFT data. Pt recalls having had "breathing tests" done in the past but none in recent years. Will order PFTs today to get better idea of degree of obstruction and also other compounding factors such as restrictive disease (given kyphosis and prior lung resection) or pulm vascular disease\par - Pt has severe COPD, which is a chronic and terminal illness. I discussed her overall disease trajectory with Pt at length. She asks "can people die from COPD" and I said yes. She values her quality of life and we discuss some end of life goals of care. She mentions that if it was "her time, then let me go". \par - At this time, will continue to optimize inhaler regimen. Continue Stiolto respimat, albuterol nebs, an add ICS today (flovent Rx sent)\par - Will start to slowly wean off prednisone. Sent Rx for prednisone 5mg which she will take for 2 weeks, then 2.5mg for 2 weeks.\par

## 2022-01-17 PROBLEM — Z78.9 NO FAMILY HISTORY OF CANCER: Status: ACTIVE | Noted: 2022-01-17

## 2022-01-18 ENCOUNTER — APPOINTMENT (OUTPATIENT)
Dept: HEMATOLOGY ONCOLOGY | Facility: CLINIC | Age: 82
End: 2022-01-18

## 2022-01-18 DIAGNOSIS — Z78.9 OTHER SPECIFIED HEALTH STATUS: ICD-10-CM

## 2022-01-18 NOTE — REVIEW OF SYSTEMS
[FreeTextEntry6] : dry cough.  Shortness of breath is stable. [FreeTextEntry7] : reflux [FreeTextEntry9] : back pain [de-identified] : uses walker [FreeTextEntry1] : Penicillins and latex gloves

## 2022-01-18 NOTE — PHYSICAL EXAM
[de-identified] : Appears chronically ill receiving supplemental oxygen via NC [de-identified] : Decreased breath sounds

## 2022-01-18 NOTE — HISTORY OF PRESENT ILLNESS
[de-identified] : This is a very pleasant 81-year-old woman with the below past medical history who has been referred for further evaluation of anemia.  She reports that an anemia is a relatively new diagnosis to her that she first heard about 1 to 2 years ago.  She reports that she has been requiring transfusional support every 2 to 3 months for the better part of the last 1 year.  Her most recent hospitalization was in the beginning of November where she presented with worsening shortness of breath and a hemoglobin of 6.3 g/dL.  She received transfusional support with improvement in symptoms and discharge.  She reports a recent bidirectional endoscopy approximately 3 weeks ago without any significant reported pathological findings.  She denies any signs of blood loss including melena. [de-identified] : She reports that her shortness of breath has been stable since the last visit.

## 2022-01-19 ENCOUNTER — APPOINTMENT (OUTPATIENT)
Dept: HEMATOLOGY ONCOLOGY | Facility: CLINIC | Age: 82
End: 2022-01-19

## 2022-02-02 ENCOUNTER — APPOINTMENT (OUTPATIENT)
Dept: HEMATOLOGY ONCOLOGY | Facility: CLINIC | Age: 82
End: 2022-02-02

## 2022-02-07 ENCOUNTER — APPOINTMENT (OUTPATIENT)
Dept: HEMATOLOGY ONCOLOGY | Facility: CLINIC | Age: 82
End: 2022-02-07

## 2022-02-24 ENCOUNTER — APPOINTMENT (OUTPATIENT)
Dept: PULMONOLOGY | Facility: CLINIC | Age: 82
End: 2022-02-24

## 2022-05-16 ENCOUNTER — TRANSCRIPTION ENCOUNTER (OUTPATIENT)
Age: 82
End: 2022-05-16

## 2022-07-04 ENCOUNTER — TRANSCRIPTION ENCOUNTER (OUTPATIENT)
Age: 82
End: 2022-07-04

## 2022-08-13 ENCOUNTER — RESULT REVIEW (OUTPATIENT)
Age: 82
End: 2022-08-13

## 2022-08-14 ENCOUNTER — RESULT REVIEW (OUTPATIENT)
Age: 82
End: 2022-08-14

## 2022-08-26 ENCOUNTER — APPOINTMENT (OUTPATIENT)
Dept: PULMONOLOGY | Facility: CLINIC | Age: 82
End: 2022-08-26

## 2022-09-03 LAB
ALBUMIN MFR SERPL ELPH: 56.2 %
ALBUMIN MFR SERPL ELPH: 56.6 %
ALBUMIN SERPL-MCNC: 3.7 G/DL
ALBUMIN SERPL-MCNC: 3.8 G/DL
ALBUMIN/GLOB SERPL: 1.3 RATIO
ALBUMIN/GLOB SERPL: 1.3 RATIO
ALPHA1 GLOB MFR SERPL ELPH: 5.1 %
ALPHA1 GLOB MFR SERPL ELPH: 5.1 %
ALPHA1 GLOB SERPL ELPH-MCNC: 0.3 G/DL
ALPHA1 GLOB SERPL ELPH-MCNC: 0.3 G/DL
ALPHA2 GLOB MFR SERPL ELPH: 10.1 %
ALPHA2 GLOB MFR SERPL ELPH: 10.3 %
ALPHA2 GLOB SERPL ELPH-MCNC: 0.7 G/DL
ALPHA2 GLOB SERPL ELPH-MCNC: 0.7 G/DL
B-GLOBULIN MFR SERPL ELPH: 10.3 %
B-GLOBULIN MFR SERPL ELPH: 14.5 %
B-GLOBULIN SERPL ELPH-MCNC: 0.7 G/DL
B-GLOBULIN SERPL ELPH-MCNC: 1 G/DL
DEPRECATED KAPPA LC FREE/LAMBDA SER: 5.88 RATIO
DEPRECATED KAPPA LC FREE/LAMBDA SER: 5.88 RATIO
DEPRECATED KAPPA LC FREE/LAMBDA SER: 6.77 RATIO
DEPRECATED KAPPA LC FREE/LAMBDA SER: 6.77 RATIO
FERRITIN SERPL-MCNC: 32 NG/ML
GAMMA GLOB FLD ELPH-MCNC: 0.9 G/DL
GAMMA GLOB FLD ELPH-MCNC: 1.2 G/DL
GAMMA GLOB MFR SERPL ELPH: 13.7 %
GAMMA GLOB MFR SERPL ELPH: 18.1 %
HAPTOGLOB SERPL-MCNC: 28 MG/DL
IGA SER QL IEP: 182 MG/DL
IGA SER QL IEP: 184 MG/DL
IGG SER QL IEP: 1112 MG/DL
IGG SER QL IEP: 991 MG/DL
IGM SER QL IEP: 335 MG/DL
IGM SER QL IEP: 346 MG/DL
INTERPRETATION SERPL IEP-IMP: NORMAL
INTERPRETATION SERPL IEP-IMP: NORMAL
IRON SATN MFR SERPL: 9 %
IRON SERPL-MCNC: 28 UG/DL
KAPPA LC CSF-MCNC: 1.8 MG/DL
KAPPA LC CSF-MCNC: 1.8 MG/DL
KAPPA LC CSF-MCNC: 1.92 MG/DL
KAPPA LC CSF-MCNC: 1.92 MG/DL
KAPPA LC SERPL-MCNC: 10.58 MG/DL
KAPPA LC SERPL-MCNC: 10.58 MG/DL
KAPPA LC SERPL-MCNC: 13 MG/DL
KAPPA LC SERPL-MCNC: 13 MG/DL
M PROTEIN MFR SERPL ELPH: 2.8 %
M PROTEIN MFR SERPL ELPH: 3.4 %
M PROTEIN SPEC IFE-MCNC: NORMAL
M PROTEIN SPEC IFE-MCNC: NORMAL
MONOCLON BAND OBS SERPL: 0.2 G/DL
MONOCLON BAND OBS SERPL: 0.2 G/DL
PROT SERPL-MCNC: 6.5 G/DL
PROT SERPL-MCNC: 6.5 G/DL
PROT SERPL-MCNC: 6.7 G/DL
PROT SERPL-MCNC: 6.7 G/DL
TIBC SERPL-MCNC: 310 UG/DL
UIBC SERPL-MCNC: 282 UG/DL

## 2022-09-16 ENCOUNTER — TRANSCRIPTION ENCOUNTER (OUTPATIENT)
Age: 82
End: 2022-09-16

## 2022-09-19 ENCOUNTER — TRANSCRIPTION ENCOUNTER (OUTPATIENT)
Age: 82
End: 2022-09-19

## 2022-09-22 ENCOUNTER — APPOINTMENT (OUTPATIENT)
Dept: PULMONOLOGY | Facility: CLINIC | Age: 82
End: 2022-09-22

## 2022-09-29 ENCOUNTER — APPOINTMENT (OUTPATIENT)
Dept: CARDIOLOGY | Facility: CLINIC | Age: 82
End: 2022-09-29